# Patient Record
Sex: FEMALE | Race: WHITE | HISPANIC OR LATINO | Employment: FULL TIME | URBAN - METROPOLITAN AREA
[De-identification: names, ages, dates, MRNs, and addresses within clinical notes are randomized per-mention and may not be internally consistent; named-entity substitution may affect disease eponyms.]

---

## 2017-04-12 PROBLEM — O26.899 ABDOMINAL PAIN AFFECTING PREGNANCY: Status: ACTIVE | Noted: 2017-04-12

## 2017-04-12 PROBLEM — O36.8190 DECREASED FETAL MOVEMENT: Status: ACTIVE | Noted: 2017-04-12

## 2017-04-12 PROBLEM — R10.9 ABDOMINAL PAIN AFFECTING PREGNANCY: Status: ACTIVE | Noted: 2017-04-12

## 2017-04-19 PROBLEM — O24.419 GESTATIONAL DIABETES MELLITUS (GDM): Status: ACTIVE | Noted: 2017-04-19

## 2018-08-01 PROBLEM — Z34.90 ENCOUNTER FOR INDUCTION OF LABOR: Status: ACTIVE | Noted: 2018-08-01

## 2019-12-14 PROBLEM — Z34.90 ENCOUNTER FOR ELECTIVE INDUCTION OF LABOR: Status: ACTIVE | Noted: 2019-12-14

## 2021-05-10 ENCOUNTER — PATIENT MESSAGE (OUTPATIENT)
Dept: RESEARCH | Facility: HOSPITAL | Age: 36
End: 2021-05-10

## 2024-08-22 ENCOUNTER — TELEPHONE (OUTPATIENT)
Dept: RHEUMATOLOGY | Facility: CLINIC | Age: 39
End: 2024-08-22

## 2024-08-22 NOTE — TELEPHONE ENCOUNTER
----- Message from Josie Alberts Deb sent at 8/22/2024 11:23 AM CDT -----  Regarding: call back  Type: Patient Call Back    Who called: pt     What is the request in detail: requesting call to schedule appt from referral sent over from her PCP     Can the clinic reply by [facility]?no    Would the patient rather a call back or a response via [facility]? call    Best call back number: [phone]     Additional Information: